# Patient Record
Sex: FEMALE | Race: BLACK OR AFRICAN AMERICAN | NOT HISPANIC OR LATINO | URBAN - METROPOLITAN AREA
[De-identification: names, ages, dates, MRNs, and addresses within clinical notes are randomized per-mention and may not be internally consistent; named-entity substitution may affect disease eponyms.]

---
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
## 2024-05-08 ENCOUNTER — TRANSFERRED RECORDS (OUTPATIENT)
Dept: HEALTH INFORMATION MANAGEMENT | Facility: CLINIC | Age: 19
End: 2024-05-08

## 2024-05-10 ENCOUNTER — TRANSFERRED RECORDS (OUTPATIENT)
Dept: HEALTH INFORMATION MANAGEMENT | Facility: CLINIC | Age: 19
End: 2024-05-10

## 2024-09-26 ENCOUNTER — OFFICE VISIT (OUTPATIENT)
Dept: FAMILY MEDICINE | Facility: CLINIC | Age: 19
End: 2024-09-26

## 2024-09-26 VITALS
HEART RATE: 67 BPM | OXYGEN SATURATION: 100 % | SYSTOLIC BLOOD PRESSURE: 107 MMHG | BODY MASS INDEX: 29.59 KG/M2 | WEIGHT: 167 LBS | RESPIRATION RATE: 16 BRPM | TEMPERATURE: 98.2 F | HEIGHT: 63 IN | DIASTOLIC BLOOD PRESSURE: 75 MMHG

## 2024-09-26 DIAGNOSIS — L50.9 URTICARIAL RASH: Primary | ICD-10-CM

## 2024-09-26 RX ORDER — DIPHENHYDRAMINE HCL 25 MG
25 CAPSULE ORAL EVERY 6 HOURS PRN
Qty: 30 CAPSULE | Refills: 1 | Status: SHIPPED | OUTPATIENT
Start: 2024-09-26 | End: 2024-09-26

## 2024-09-26 RX ORDER — DIPHENHYDRAMINE HCL 25 MG
25 CAPSULE ORAL AT BEDTIME
Qty: 30 CAPSULE | Refills: 1 | Status: SHIPPED | OUTPATIENT
Start: 2024-09-26

## 2024-09-26 NOTE — PROGRESS NOTES
Preceptor Attestation:  Patient seen and evaluated in person. I discussed the patient with the resident. I have verified the content of the note, which accurately reflects my assessment of the patient and the plan of care.   Supervising Physician:  Meena Veronica DO

## 2024-09-26 NOTE — PROGRESS NOTES
"  Assessment & Plan     Urticarial rash  Pruritic urticarial rash that has come and gone over the past few years. Linette was counseled to use fragrance free lotions and body washes. Other that lotions/body wash with perfumes, she does not know what could be causing her rashes. Patch testing was ordered, and she was instructed to take Benadryl at night and follow up in one month to see if the frequency of her rashes decrease.  - Adult Allergy/Asthma  Referral; Future  - diphenhydrAMINE (BENADRYL) 25 MG capsule; Take 1 capsule (25 mg) by mouth at bedtime for itching or allergies.    BMI  Estimated body mass index is 29.58 kg/m  as calculated from the following:    Height as of this encounter: 1.6 m (5' 3\").    Weight as of this encounter: 75.8 kg (167 lb).     Return in about 1 month (around 10/26/2024).    Subjective   Linette is a 19 year old, presenting for the following health issues:  Derm Problem (Itching )      9/26/2024     8:07 AM   Additional Questions   Roomed by Dorothy   Accompanied by self         9/26/2024    Information    services provided? No        HPI   - Diffuse, pruritic rash all over body  - Primarily happens in the afternoon or night, but can also be random  - Takes a couple of days to resolve  - No mitigating or exacerbating factors  - Onset: couple of years ago  - No one else in the house has this rash  - Not experiencing symptoms right now  - Uses lotion and body wash with perfumes    Review of Systems  Constitutional, neuro, ENT, endocrine, pulmonary, cardiac, gastrointestinal, genitourinary, musculoskeletal, integument and psychiatric systems are negative, except as otherwise noted.      Objective    /75   Pulse 67   Temp 98.2  F (36.8  C) (Oral)   Resp 16   Ht 1.6 m (5' 3\")   Wt 75.8 kg (167 lb)   LMP 09/18/2024 (Approximate)   SpO2 100%   BMI 29.58 kg/m    Body mass index is 29.58 kg/m .    Physical Exam  Vitals reviewed.   Constitutional:       " Appearance: Normal appearance.   HENT:      Head: Normocephalic and atraumatic.      Nose: Nose normal.   Eyes:      Extraocular Movements: Extraocular movements intact.      Conjunctiva/sclera: Conjunctivae normal.   Pulmonary:      Effort: Pulmonary effort is normal.   Musculoskeletal:         General: Normal range of motion.      Cervical back: Normal range of motion and neck supple.   Skin:     General: Skin is warm and dry.      Findings: No rash.   Neurological:      General: No focal deficit present.      Mental Status: She is alert and oriented to person, place, and time.        Signed Electronically by: Tiffanie Johnston DO, MPH

## 2024-09-26 NOTE — PATIENT INSTRUCTIONS
Patient Education   Here is the plan from today's visit    1. Urticarial rash  - Adult Allergy/Asthma  Referral; Future  - diphenhydrAMINE (BENADRYL) 25 MG capsule; Take 1 capsule (25 mg) by mouth every 6 hours as needed for itching or allergies.  Dispense: 30 capsule; Refill: 1    Please call or return to clinic if your symptoms don't go away.    Follow up plan  Return in about 1 month (around 10/26/2024).    Thank you for coming to [facility] today.  Lab Testing:  **If you had lab testing today and your results are reassuring or normal they will be mailed to you or sent through [employer] within 7 days.   **If the lab tests need quick action we will call you with the results.  **If you are having labs done on a different day, please call [phone] to schedule at [facility] or [phone] for other [facility] locations. Labs do not offer walk-in appointments.  The phone number we will call with results is # [phone] (home) . If this is not the best number please call our clinic and change the number.  Medication Refills:  If you need any refills please call your pharmacy and they will contact us.   If you need to  your refill at a new pharmacy, please contact the new pharmacy directly. The new pharmacy will help you get your medications transferred faster.   Scheduling:  If you have any concerns about today's visit or wish to schedule another appointment please call our office during normal business hours [phone] (8-5:00 M-F). If you can no longer make a scheduled visit, please cancel via [employer] or call us to cancel.   If a referral was made to an [facility] specialty provider and you do not get a call from central scheduling, please refer to directions on your visit summary or call our office during normal business hours for assistance.   If a Mammogram was ordered for you at [facility] call [phone] to schedule or change your appointment.  If  you had an XRay/CT/Ultrasound/MRI ordered the number is [phone] to schedule or change your radiology appointment.   [facility] has limited ultrasound appointments available on Wednesdays, if you would like your ultrasound at [facility], please call [phone] to schedule.   Medical Concerns:  If you have urgent medical concerns please call [phone] at any time of the day.    Tiffanie Johnston, DO

## 2024-10-05 ENCOUNTER — HEALTH MAINTENANCE LETTER (OUTPATIENT)
Age: 19
End: 2024-10-05